# Patient Record
Sex: MALE | Race: WHITE
[De-identification: names, ages, dates, MRNs, and addresses within clinical notes are randomized per-mention and may not be internally consistent; named-entity substitution may affect disease eponyms.]

---

## 2020-02-04 ENCOUNTER — HOSPITAL ENCOUNTER (OUTPATIENT)
Dept: HOSPITAL 46 - OPS | Age: 70
Discharge: HOME | End: 2020-02-04
Attending: SURGERY
Payer: MEDICARE

## 2020-02-04 VITALS — BODY MASS INDEX: 25.06 KG/M2 | WEIGHT: 178.99 LBS | HEIGHT: 71 IN

## 2020-02-04 DIAGNOSIS — Z80.0: ICD-10-CM

## 2020-02-04 DIAGNOSIS — K64.8: ICD-10-CM

## 2020-02-04 DIAGNOSIS — Z12.11: Primary | ICD-10-CM

## 2020-02-04 DIAGNOSIS — N40.0: ICD-10-CM

## 2020-02-04 DIAGNOSIS — Z79.899: ICD-10-CM

## 2020-02-04 DIAGNOSIS — Z88.8: ICD-10-CM

## 2020-02-04 DIAGNOSIS — Z87.19: ICD-10-CM

## 2020-02-04 DIAGNOSIS — E78.5: ICD-10-CM

## 2020-02-04 DIAGNOSIS — Z98.890: ICD-10-CM

## 2020-02-04 PROCEDURE — G0500 MOD SEDAT ENDO SERVICE >5YRS: HCPCS

## 2020-02-04 PROCEDURE — 0DJD8ZZ INSPECTION OF LOWER INTESTINAL TRACT, VIA NATURAL OR ARTIFICIAL OPENING ENDOSCOPIC: ICD-10-PCS | Performed by: SURGERY

## 2020-02-04 NOTE — NUR
02/04/20 1131 Lucina Orona
1124 PATIENT ARRIVES TO PACU SLEEPING, AWAKENS WITH VERBAL STIMULI,
THEN BACK TO SLEEP. RESP EVEN AND UNLABORED, SNORING AT TIMES, BUT
RESOLVED WHEN HOB ELEVATED TO 45DEGREES. NC AT 2 LITERS OFF ON ARRIVAL
TO PACU. PATIENT PASSING GAS.

## 2020-02-05 NOTE — OR
[facility]
                                    [address], [place]  [address]
_________________________________________________________________________________________
                                                                 Signed   
 
 
DATE OF OPERATION:
02/04/2020
 
SURGEON:
Vinod Hayward MD
 
PREOPERATIVE DIAGNOSES:
1. Half-brother with colon cancer (60s).
2. Hemorrhoidectomy in 1980s.
3. Hemorrhoids.
 
POSTOPERATIVE DIAGNOSIS:
Minimal internal hemorrhoids.
 
PROCEDURE:
Colonoscopy without biopsy.
 
ESTIMATED BLOOD LOSS:
None.
 
INDICATIONS:
Andrew is a 69-year-old gentleman, who had a negative colonoscopy in 2008 and in 2014.
He comes every 5 years for followup colonoscopy.  His half-brother had developed colon
cancer in his 60s, although he did die later in his 80s.  He thinks it was from food
poisoning.  Fadi currently has no lower GI complaints.  I met with Fadi in the office.
I gave him a pamphlet on colonoscopy.  He remembers that test quite well.  He
understands there is risk including, but not limited to gas bloating, crampy abdominal
pain, bleeding, perforation requiring surgery, and missed diagnosis.  He also
understands the need for IV conscious sedation.  He told me today that he had some
nausea afterwards from his previous colonoscopy.  Consequently, we decided to give him 4
mg of Zofran IV and 6.25 mg of Phenergan IV before the test.  He had expressed
understanding and wished to proceed. 
 
PROCEDURE NOTE:
Andrew was taken into our endoscopy suite and placed in the left lateral decubitus
position.  After the IV Zofran and Phenergan, he was given a total of 5 mg of Versed and
100 mcg of fentanyl to cover the case.  A digital rectal exam was performed.  This was
unremarkable.  Specifically not much in the way of any external hemorrhoid tissue.  He
has good sphincter tone.  Prostate is mildly indurated and mildly enlarged.  The adult
colonoscope was introduced then and advanced all around into the cecum under direct
visualization of the camera without difficulty.  His prep was good.  The scope was
slowly withdrawn.  We could easily see his appendiceal orifice and ileocecal valve.  We
 
    Electronically Signed By: VINOD HAYWARD MD  02/05/20 0644
_________________________________________________________________________________________
PATIENT NAME:     ANDREW ARDON                   
MEDICAL RECORD #: [id number]            OPERATIVE REPORT              
          ACCT #: [id number]  
DATE OF BIRTH:   [birth year]            REPORT #: [id number]      
PHYSICIAN:        VINOD HAYWARD MD             
PCP:              ANNABELLE GUERRERO MD                
REPORT IS CONFIDENTIAL AND NOT TO BE RELEASED WITHOUT AUTHORIZATION
 
 
                                  [facility]
                                    [address] [place] [address]  [id number]
_________________________________________________________________________________________
                                                                 Signed   
 
 
had taken several pictures throughout for photodocumentation.  We found no
diverticulosis and no polyps throughout the colon or rectum.  Upon retroflexion of
scope, he does have minimal internal hemorrhoid tissue.  The gas was then suctioned out
and the colonoscope removed.  Andrew tolerated the procedure quite well. 
 
RECOMMENDATIONS:
I will see Andrew back in 5 years for repeat colonoscopy due to his family history.
 
 
 
            ________________________________________
            MD LAYTON Parker/ALLAL
Job #:  [id number]
DD:  02/04/2020 11:26:08
DT:  02/04/2020 18:45:33
 
cc:            MD Annabelle Parker MD
 
 
Copies:  VINOD HAYWARD MD
~
 
 
 
 
 
 
 
 
 
 
 
 
 
 
 
 
 
    Electronically Signed By: VINOD HAYWARD MD  02/05/20 0644
_________________________________________________________________________________________
PATIENT NAME:     ANDREW ARDON                   
MEDICAL RECORD #: [id number]            OPERATIVE REPORT              
          ACCT #: [id number]  
DATE OF BIRTH:   [birth year]            REPORT #: [id number]      
PHYSICIAN:        VINOD HAYWARD MD             
PCP:              ANNABELLE GUERRERO MD                
REPORT IS CONFIDENTIAL AND NOT TO BE RELEASED WITHOUT AUTHORIZATION

## 2025-07-25 ENCOUNTER — HOSPITAL ENCOUNTER (OUTPATIENT)
Dept: HOSPITAL 46 - DS | Age: 75
Discharge: HOME | End: 2025-07-25
Attending: SURGERY
Payer: MEDICARE

## 2025-07-25 VITALS — BODY MASS INDEX: 23.77 KG/M2 | HEIGHT: 71 IN | WEIGHT: 169.76 LBS

## 2025-07-25 VITALS — SYSTOLIC BLOOD PRESSURE: 153 MMHG | DIASTOLIC BLOOD PRESSURE: 82 MMHG

## 2025-07-25 VITALS — SYSTOLIC BLOOD PRESSURE: 144 MMHG | DIASTOLIC BLOOD PRESSURE: 66 MMHG

## 2025-07-25 DIAGNOSIS — Z79.899: ICD-10-CM

## 2025-07-25 DIAGNOSIS — Z80.0: ICD-10-CM

## 2025-07-25 DIAGNOSIS — I10: ICD-10-CM

## 2025-07-25 DIAGNOSIS — Z12.11: Primary | ICD-10-CM

## 2025-07-25 DIAGNOSIS — Z88.8: ICD-10-CM

## 2025-07-25 DIAGNOSIS — K64.8: ICD-10-CM

## 2025-07-25 PROCEDURE — 0DJD8ZZ INSPECTION OF LOWER INTESTINAL TRACT, VIA NATURAL OR ARTIFICIAL OPENING ENDOSCOPIC: ICD-10-PCS | Performed by: SURGERY
